# Patient Record
Sex: FEMALE
[De-identification: names, ages, dates, MRNs, and addresses within clinical notes are randomized per-mention and may not be internally consistent; named-entity substitution may affect disease eponyms.]

---

## 2019-07-12 ENCOUNTER — HOSPITAL ENCOUNTER (OUTPATIENT)
Dept: HOSPITAL 89 - US | Age: 34
End: 2019-07-12
Attending: PHYSICIAN ASSISTANT
Payer: COMMERCIAL

## 2019-07-12 VITALS — BODY MASS INDEX: 22.31 KG/M2

## 2019-07-12 DIAGNOSIS — E04.1: Primary | ICD-10-CM

## 2019-07-12 PROCEDURE — 76536 US EXAM OF HEAD AND NECK: CPT

## 2019-07-12 NOTE — RADIOLOGY IMAGING REPORT
FACILITY: [facility] - [place] 

 

PATIENT NAME: Quin Borges

: 1985

MR: [id number]

V: [id number]

EXAM DATE: 

ORDERING PHYSICIAN: MOLLY HUBER

TECHNOLOGIST: 

 

Location: [facility] - [place]

Patient: Quin Borges

: 1985

MRN: [id number]

Visit/Account:[id number]

Date of Sevice:  2019

 

ACCESSION #: [id number]

 

THYROID

 

HISTORY:  Left thyroid mass

 

COMPARISON:  2015

 

FINDINGS:

 

SIZE:

Right lobe: 5.6 x 1.1 x 2.1 cm cm

Left lobe: 5.8 x 1.1 x 2 cm cm

Isthmus: 2.2 mm

 

PARENCHYMA: Homogeneous.

 

NODULES:

Right lobe:

*  None discrete.

Left lobe:

*  There is a 6 mm well-circumscribed hypoechoic nodule in the mid left lobe

 

*  In the inferior left lobe there is a complex partially cystic partially solid hypervascular mass m
easuring 2.2 x 1.2 x 1.8 cm.  This previously measured 1.8 x 1.7 x 1.1 cm correlation with previous b
iopsy results recommended

Isthmus:

*  None discrete.

 

VASCULARITY: Mild increased vascularity bilaterally

 

ADDITIONAL FINDINGS: None.

 

IMPRESSION:

There is a complex partially cystic hypervascular mass in the inferior left lobe measuring 2.2 x 1.2 
x 1.8 cm previously 1.8 x 1.7 x 1.1 cm.  Correlation with previous biopsy results needed

 

 

REFERENCE:

2015 [employer]roid [employer] Management Guidelines for Adult Patients with Thyroid Nodules and D
ifferentiated Thyroid Cancer: The American Thyroid Association Guidelines Task Force on Thyroid Nodul
es and Differentiated Thyroid Cancer.

 

SONOGRAPHIC PATTERNS:

*  Benign: Purely cystic nodules (no solid component); estimated risk of malignancy <1 percent; no bi
opsy recommended.

*  Very Low Suspicion: Spongiform or partially cystic nodules without any of the sonographic features
 described in low, intermediate, or high suspicion patterns; estimated risk of malignancy <3 percent;
 consider FNA at > 2 cm (Observation without FNA is also a reasonable option).

*  Low Suspicion: Isoechoic or hyperechoic solid nodule, or partially cystic nodule with eccentric so
lid areas, without microcalcification, irregular margin or ETE (extra-thyroidal extension), or taller
 than wide shape; estimated risk of malignancy 5-10 percent; recommend FNA at >1.5 cm.

*  Intermediate Suspicion: Hypoechoic solid nodule with smooth margins without microcalcifications, E
TE (extra-thyroidal extension), or taller than wide shape; estimated risk of malignancy 10-20 percent
; recommend FNA at > 1 cm.

*  High Suspicion: Solid hypoechoic nodule or solid hypoechoic component of a partially cystic nodule
 with one or more of the following features: irregular margins (infiltrative, microlobulated), microc
alcifications, taller than wide shape, rim calcifications with small extrusive soft tissue component,
 evidence of ETE (extra-thyroidal extension); estimated risk of malignancy >70-90 percent; recommend 
FNA at > 1 cm.

 

NOTES:

*  Although a sonographically suspicious subcentimeter thyroid nodule without evidence of extrathyroi
cruz extension or sonographically suspicious lymph nodes may be observed with close sonographic follow
-up rather than pursuing immediate FNA, patient age and preference may modify decision-making.

A > 50% interval increase in nodule volume and/or development of new suspicious sonographic features 
are felt to be a valid reasons for potential re-aspiration of a nodule previously shown to have benig
n FNA cytology.

 

Report Dictated By: Giovanna Muñoz MD at 2019 3:38 PM

 

Report E-Signed By: Giovanna Muñoz MD  at 2019 3:41 PM

 

WSN:RADHA